# Patient Record
(demographics unavailable — no encounter records)

---

## 2018-01-01 NOTE — DELATT
===========================

Datetime: 2018 11:14

===========================

   

Del Note Departure Status:   Nursery

Del Note Status:  OBSERVATIONAL CARE

Del Note Interventions Oth:  CALLED TO ATTEND NVD OF +36 WKS, WITH TERMINAL MECONIUM.

   BABY CRIED, VIGOROUS AND PINKISH.

   APGAR 9,9

Del Note Interventions:  Assessment; Stimulation; Drying

Del Note Reason for Attending:  Prematurity; Evaluation; Meconium

[facility]/NICU Del Atten Note Adm DT:  2018 11:15

## 2018-01-01 NOTE — NBDCN
===========================

Datetime: 2018 16:08

===========================

   

Nsy Prov Gen Appearance:  Notable

Nsy Prov Skin:  Within Normal Limits; Jaundice

Nsy Prov Neuro:  Normal Tone; Blum; Grasp; Root; Suck

Nsy Prov Musculoskeletal:  Within Normal Limits; Full Range of Motion; Spontaneous Movement All Extre
mities; Intact Clavicles; Clavicles without Crepitus; Gluteal Folds Symmetrical; Spine Within Normal 
Limits; No Sacral Dimple/Cyst

Nsy Prov Head:  Normal Fontanelles; Normocephalic; Sutures WNL

Nsy Prov EENT:  Mouth Within Normal Limits; Ears Within Normal Limits; Eyes Within Normal Limits; Eye
s Red Reflex Bilaterally; Nose Within Normal Limits; Face Within Normal Limits

Nsy Prov Cardiovascular:  Within Normal Limits; Normal Pulses

Nsy Prov Respiratory:  Within Normal Limits

Nsy Prov GI:  Within Normal Limits; Soft; Normal Liver; Non Palpable Spleen; Patent Anus

Nsy Prov Umbilicus:  Within Normal Limits; Three Vessel Cord

Nsy Prov :  Normal Female Genitalia

Nsy Prov Gen Appearance Details:  

Nsy Prov Discharge:  Discharge Home Today; Vital Signs Appropriate; Bonding Appropriately; Voiding an
d Stooling; Appropriate Weight Loss

Nsy Prov Disch Comments:   +36 wks, mild jaundice. NVD.

   Doing well.

   Plan of care discussed with mother.

   

===========================

Datetime: 2018 11:00

===========================

   

Hearing Screen Retest Result, NB:  Right Ear Pass; Left Ear Pass

Hearing Screen Status:  Hearing Screen Complete

   

===========================

Datetime: 2018 08:00

===========================

   

Length cms, NB:  46.00

Formula Type:  [place]

Length in, NB:  18.11

Head Circumference (cm), NB:  32.50

 Screenin2018 08:00

   

===========================

Datetime: 2018 20:50

===========================

   

Hepatitis B Vaccine NB:  2018 00:00

   

===========================

Datetime: 2018 20:00

===========================

   

Blood Type:  O Positive

Lab, Direct [place]:  Negative

   

===========================

Datetime: 2018 18:24

===========================

   

Infant Birthdate and Time:  2018 09:36

Infant Sex - 1:  Female

Gestational Age at [facility]:  35.6

Method of Delivery:  Vaginal

Vacuum Extraction:  N/A

Forceps:  N/A

Mother's Steroids Given:  Full Course; > 24 Hours before Delivery

Apgar Score 1, NB:  9

Apgar Score5, NB:  9

Maternal Amniotic Fluid Color:  Light Meconium

Mother's Blood Type:  A POS

Mother's Hepatitis B:  Negative

Mother's RPR/VDRL:  Nonreactive

Mother's HIV+ Exposure Test MBL:  Negative

Mother's Hx Herpes:  No

Mother's Rubella:  Immune

Mother's Group Beta Strep:  Done, Result Unknown

Mother's Antibiotics # of Doses:  1

Admission Birthweight, NB:  2265

Infant Weight (lb) MBL:  5

Infant Weight (oz) MBL:  0

Maternal Feeding Preference:  Bottle

   

===========================

Datetime: 2018 15:45

===========================

   

Hearing Screen Result, NB:  Right Ear Pass; Left Ear Refer

   

===========================

Datetime: 2018 14:00

===========================

   

Congenital Heart Screen:  Negative, Congenital Heart Screen Complete

   

===========================

Datetime: 2018 11:14

===========================

   

Discharge Weight gms NB:  2195

Discharge Weight lbs NB:  4

Discharge Weight oz NB:  13

Follow up in Weeks NB:  2 days

Disch Follow Up With:  

Follow up Appt with NB:  Office

   

===========================

Datetime: 2018 10:20

===========================

   

Chest Circumference, NB:  28.00

## 2018-01-01 NOTE — NBADN
===========================

Datetime: 2018 11:15

===========================

   

Nsy Prov Gen Appearance:  Notable

Nsy Prov Gen Appearance:  Notable

Nsy Prov Skin:  Within Normal Limits

Nsy Prov Neuro:  Normal Tone; [place]; Grasp; Root; Suck

Nsy Prov Musculoskeletal:  Within Normal Limits; Full Range of Motion; Spontaneous Movement All Extre
mities; Intact Clavicles; Clavicles without Crepitus; Gluteal Folds Symmetrical; Spine Within Normal 
Limits; No Sacral Dimple/Cyst

Nsy Prov Head:  Normal Fontanelles; Normocephalic; Sutures WNL

Nsy Prov EENT:  Mouth Within Normal Limits; Ears Within Normal Limits; Eyes Within Normal Limits; Eye
s Red Reflex Bilaterally; Nose Within Normal Limits; Face Within Normal Limits

Nsy Prov Cardiovascular:  Within Normal Limits; Normal Pulses

Nsy Prov Respiratory:  Within Normal Limits

Nsy Prov GI:  Within Normal Limits; Soft; Normal Liver; Non Palpable Spleen; Patent Anus

Nsy Prov Umbilicus:  Within Normal Limits; Three Vessel Cord

Nsy Prov :  Normal Female Genitalia

Nsy Prov Gen Appearance Details:  

Nsy Prov Impression:  Vital Signs Appropriate; Bonding Appropriately

Nsy Prov Plan:  Continue  Care

Nsy Prov Impression/Plan Details:  WELL  GIRL, NVD.

   OBSERVATIONAL CARE.

   

===========================

Datetime: 2018 11:14

===========================

   

Mother's Rule Inc Maternal Age:  Age >=35 at MANE not specified

Mother's Rule Thalassemia:  Thalassemia History not specified

Mother's Rule Neural Tube Defect:  Neural Tube Defect History not specified

Mother's Rule Congenital Heart:  Congenital Heart Defect not specified

Mother's Rule Down Syndrome:  Down Syndrome History not specified

Mother's Rule Karan-Sachs:  Karan-Sachs History not specified

Mother's Rule Canavan:  Canavan History not specified

Mother's Rule Familial Dysauto:  Familial Dysautonomia History not specified

Mother's Rule Sickle Cell:  Sickle Cell Disease/Trait History not specified

Mother's Rule Hemophilia:  Hemophilia/Blood Disorder History not specified

Mother's Rule Muscular Dystrophy:  Muscular Dystrophy History not specified

Mother's Rule Cystic Fibrosis:  Cystic Fibrosis History not specified

Mother's Rule [place]'s Chor:  Vickie's Chorea History not specified

Mother's Rule Mental Retardation:  Mental Retardation/Autism History not specified

Mother's Rule Fragile X:  Fragile X Testing History not specified

Mother's Rule Oth Inherited DO:  Other Inherited/Chromosomal Disorders not specified

Mother's Rule Maternal Metabolic:  Maternal Metabolic History not specified

Mother's Rule FOB Birth Defects:  Pt Father or FOB Birth Defect History not specified

Mother's Rule Hx Stillborn MBL:  Loss/Stillborn History not specified

Mother's Rule Other Genetic Hx:  Other Genetic History not specified

Mother's Rule Drugs/Medications:  Drugs/Medications History not specified

Mother's Rule Gonorrhea:   Gonorrhea History Not Specified

Mother's Rule Chlamydia:  Chlamydia History not specified

Mother's Rule Syphilis:  Syphilis History not specified

Mother's Rule HIV/AIDS Exp:  HIV/Aids Exposure not specified

Mother's Rule HPV:  Human Papillomavirus History not specified

Mother's Rule Genital Herpes:  Genital Herpes not specified

Mother's Rule TB:  Tuberculosis History not specified

Mother's Rule Hepatitis:  Hepatitis History Not Specified

Mother's Rule Rash or Viral Ill:  Rash or Viral Illness History not specified

Mother's Rule Diabetes:  Diabetes History not specified

Mother's Rule Hypertension MBL:  History of Hypertension Not Specified

Mother's Rule Heart Disease:  Heart Disease History not specified

Mother's Rule Autoimmune:  Autoimmune Disorder History not specified

Mother's Rule Kidney Disease:  History of Kidney Disease/UTI not specified

Mother's Rule Neurologic:  Neurologic/Epilepsy Disorders not specified

Mother's Rule Psych Disorders:  Psychiatric Disorder History not specified

Mother's Rule Depression/PP Dep:  Depression/Postpartum Depression History not specified

Mother's Rule Hepaitis/tLiver:  History of Hepatitis/Liver Disease not specified

Mother's Rule Varicos/Phlebitis:  Varicosities/Phlebitis History Not Specified

Mother's Rule Thyroid Dysfunct:  Thyroid Dysfunction not specified

Mother's Rule Trauma/Violence:  Trauma/Violence History Not Specified

Mother's Rule Blood Transfusion:  Blood Transfusion History not specified

Mother's Rule Sensitization:  D (Rh) Sensitization not specified

Mother's Rule Pulmonary:  Pulmonary (Asthma, TB) History not specified

Mother's Rule Breast:  Breast History not specified

Mother's Rule Gyn Surgery:  Gyn Surgery Hx not specified

Mother's Rule Hosp/Surgery:  Hospitalization/Surgery History not specified

Mother's Rule Anesthetic Comp:  Anesthetic Complications Hx not specified

Mother's Rule Abnormal Pap:  Abnormal Pap Smear not specified

Mother's Rule Uterine Anomaly:  Uterine Anomaly/PARRIS not specified

Mother's Rule Infertility:  Infertility Not Specified

Mother's Rule ART Treatment:  ART Treatment History not specified

Mother's Rule Other Med Disease:  Other Medical Diseases History not specified

Mother's Rule Family History:  Significant Family History not specified

## 2018-01-01 NOTE — NBPN
===========================

Datetime: 2018 09:32

===========================

   

Nsy Prov Gen Appearance:  Within Normal Limits

Nsy Prov Skin:  Within Normal Limits

Nsy Prov Neuro:  Normal Tone; [place]; Grasp; Root; Suck

Nsy Prov Musculoskeletal:  Within Normal Limits; Full Range of Motion; Spontaneous Movement All Extre
mities; Intact Clavicles; Clavicles without Crepitus; Gluteal Folds Symmetrical; Spine Within Normal 
Limits; No Sacral Dimple/Cyst

Nsy Prov Head:  Normal Fontanelles; Normocephalic; Sutures WNL

Nsy Prov EENT:  Mouth Within Normal Limits; Ears Within Normal Limits; Eyes Within Normal Limits; Eye
s Red Reflex Bilaterally; Nose Within Normal Limits; Face Within Normal Limits

Nsy Prov Cardiovascular:  Within Normal Limits; Normal Pulses

Nsy Prov Respiratory:  Within Normal Limits

Nsy Prov GI:  Within Normal Limits; Soft; Normal Liver; Non Palpable Spleen; Patent Anus

Nsy Prov Umbilicus:  Within Normal Limits; Three Vessel Cord

Nsy Prov :  Normal Female Genitalia

Nsy Prov PE Comments:  Pt. examined with mother and MGM @ bedside.

Nsy Prov Impression:  Healthy Term ; Vital Signs Appropriate; Bonding Appropriately; Voiding a
nd Stooling

Nsy Prov Plan:  Continue [place] Care; Lactation Consult

Nsy Prov Impression/Plan Details:  Dx: 1 day old, 35.6 wks  AGA Female//Unknown GBS:done

   Plans: Continue Routine NN Care.

             F/U B/C results.

             Plans discussed with mother @ bedside.

      

Nsy Prov Laboratory:  None.

   

===========================

Datetime: 2018 11:15

===========================

   

Nsy Prov Gen Appearance Details: